# Patient Record
Sex: FEMALE | Race: WHITE | URBAN - METROPOLITAN AREA
[De-identification: names, ages, dates, MRNs, and addresses within clinical notes are randomized per-mention and may not be internally consistent; named-entity substitution may affect disease eponyms.]

---

## 2022-01-31 ENCOUNTER — OFFICE VISIT (OUTPATIENT)
Dept: FAMILY MEDICINE CLINIC | Age: 67
End: 2022-01-31
Payer: MEDICARE

## 2022-01-31 VITALS
TEMPERATURE: 97.9 F | WEIGHT: 140 LBS | HEART RATE: 78 BPM | HEIGHT: 65 IN | OXYGEN SATURATION: 97 % | BODY MASS INDEX: 23.32 KG/M2 | DIASTOLIC BLOOD PRESSURE: 82 MMHG | SYSTOLIC BLOOD PRESSURE: 134 MMHG

## 2022-01-31 DIAGNOSIS — J44.9 CHRONIC OBSTRUCTIVE PULMONARY DISEASE, UNSPECIFIED COPD TYPE (HCC): ICD-10-CM

## 2022-01-31 DIAGNOSIS — Z86.11 HISTORY OF TUBERCULOSIS: ICD-10-CM

## 2022-01-31 DIAGNOSIS — Z13.220 SCREENING FOR HYPERLIPIDEMIA: ICD-10-CM

## 2022-01-31 DIAGNOSIS — Z12.11 COLON CANCER SCREENING: ICD-10-CM

## 2022-01-31 DIAGNOSIS — F17.210 CIGARETTE SMOKER: ICD-10-CM

## 2022-01-31 DIAGNOSIS — Z13.1 ENCOUNTER FOR SCREENING EXAMINATION FOR IMPAIRED GLUCOSE REGULATION AND DIABETES MELLITUS: ICD-10-CM

## 2022-01-31 DIAGNOSIS — Z12.31 BREAST CANCER SCREENING BY MAMMOGRAM: ICD-10-CM

## 2022-01-31 DIAGNOSIS — E78.2 MIXED HYPERLIPIDEMIA: ICD-10-CM

## 2022-01-31 DIAGNOSIS — R51.9 CHRONIC NONINTRACTABLE HEADACHE, UNSPECIFIED HEADACHE TYPE: ICD-10-CM

## 2022-01-31 DIAGNOSIS — M81.0 AGE-RELATED OSTEOPOROSIS WITHOUT CURRENT PATHOLOGICAL FRACTURE: ICD-10-CM

## 2022-01-31 DIAGNOSIS — Z13.820 SCREENING FOR OSTEOPOROSIS: ICD-10-CM

## 2022-01-31 DIAGNOSIS — G89.29 CHRONIC NONINTRACTABLE HEADACHE, UNSPECIFIED HEADACHE TYPE: ICD-10-CM

## 2022-01-31 DIAGNOSIS — Z23 NEED FOR INFLUENZA VACCINATION: ICD-10-CM

## 2022-01-31 DIAGNOSIS — F33.0 MILD EPISODE OF RECURRENT MAJOR DEPRESSIVE DISORDER (HCC): Primary | ICD-10-CM

## 2022-01-31 PROCEDURE — G0008 ADMIN INFLUENZA VIRUS VAC: HCPCS | Performed by: NURSE PRACTITIONER

## 2022-01-31 PROCEDURE — 90694 VACC AIIV4 NO PRSRV 0.5ML IM: CPT | Performed by: NURSE PRACTITIONER

## 2022-01-31 PROCEDURE — 99203 OFFICE O/P NEW LOW 30 MIN: CPT | Performed by: NURSE PRACTITIONER

## 2022-01-31 RX ORDER — CITALOPRAM 20 MG/1
20 TABLET ORAL DAILY
Qty: 30 TABLET | Refills: 2 | Status: SHIPPED | OUTPATIENT
Start: 2022-01-31

## 2022-01-31 RX ORDER — BUDESONIDE AND FORMOTEROL FUMARATE DIHYDRATE 80; 4.5 UG/1; UG/1
2 AEROSOL RESPIRATORY (INHALATION) 2 TIMES DAILY
Qty: 10.2 G | Refills: 3 | Status: SHIPPED | OUTPATIENT
Start: 2022-01-31

## 2022-01-31 SDOH — ECONOMIC STABILITY: FOOD INSECURITY: WITHIN THE PAST 12 MONTHS, THE FOOD YOU BOUGHT JUST DIDN'T LAST AND YOU DIDN'T HAVE MONEY TO GET MORE.: NEVER TRUE

## 2022-01-31 SDOH — ECONOMIC STABILITY: FOOD INSECURITY: WITHIN THE PAST 12 MONTHS, YOU WORRIED THAT YOUR FOOD WOULD RUN OUT BEFORE YOU GOT MONEY TO BUY MORE.: NEVER TRUE

## 2022-01-31 ASSESSMENT — PATIENT HEALTH QUESTIONNAIRE - PHQ9
SUM OF ALL RESPONSES TO PHQ QUESTIONS 1-9: 2
2. FEELING DOWN, DEPRESSED OR HOPELESS: 1
SUM OF ALL RESPONSES TO PHQ QUESTIONS 1-9: 2
SUM OF ALL RESPONSES TO PHQ9 QUESTIONS 1 & 2: 2
SUM OF ALL RESPONSES TO PHQ QUESTIONS 1-9: 2
SUM OF ALL RESPONSES TO PHQ QUESTIONS 1-9: 2
1. LITTLE INTEREST OR PLEASURE IN DOING THINGS: 1

## 2022-01-31 ASSESSMENT — ENCOUNTER SYMPTOMS
PHOTOPHOBIA: 1
VOMITING: 0
CONSTIPATION: 0
NAUSEA: 0
SCALP TENDERNESS: 1
COUGH: 0
SHORTNESS OF BREATH: 1
DIFFICULTY BREATHING: 1
DIARRHEA: 0
BACK PAIN: 0
BLOOD IN STOOL: 0
SORE THROAT: 0
ABDOMINAL PAIN: 0

## 2022-01-31 ASSESSMENT — COLUMBIA-SUICIDE SEVERITY RATING SCALE - C-SSRS
6. HAVE YOU EVER DONE ANYTHING, STARTED TO DO ANYTHING, OR PREPARED TO DO ANYTHING TO END YOUR LIFE?: NO
1. WITHIN THE PAST MONTH, HAVE YOU WISHED YOU WERE DEAD OR WISHED YOU COULD GO TO SLEEP AND NOT WAKE UP?: NO
2. HAVE YOU ACTUALLY HAD ANY THOUGHTS OF KILLING YOURSELF?: NO

## 2022-01-31 ASSESSMENT — COPD QUESTIONNAIRES: COPD: 1

## 2022-01-31 ASSESSMENT — SOCIAL DETERMINANTS OF HEALTH (SDOH): HOW HARD IS IT FOR YOU TO PAY FOR THE VERY BASICS LIKE FOOD, HOUSING, MEDICAL CARE, AND HEATING?: NOT HARD AT ALL

## 2022-01-31 NOTE — PROGRESS NOTES
Vaccine Information Sheet, \"Influenza - Inactivated\"  given to Cassy Desouza, or parent/legal guardian of  Cassy Desouza and verbalized understanding. Patient responses:    Have you ever had a reaction to a flu vaccine? No  Are you able to eat eggs without adverse effects? Yes  Do you have any current illness? No  Have you ever had [place] Syndrome? No    Flu vaccine given per order. Please see immunization tab.

## 2022-01-31 NOTE — PATIENT INSTRUCTIONS
SURVEY:    You may be receiving a survey from [employer] regarding your visit today. Please complete the survey to enable us to provide the highest quality of care to you and your family. If you cannot score us a very good (5 Stars) on any question, please call the office to discuss how we could have made your experience a very good one. Thank you.     Clinical Care Team: RICA Wick-DELMI Orantes LPN    Clerical Team: Shira Gonsalez

## 2022-01-31 NOTE — PROGRESS NOTES
HPI Notes    Name: Latoya Barrera  : 1955         Chief Complaint:     Chief Complaint   Patient presents with   [facility] Establish Care     Patient here today to establish care.  Mental Health Problem     Patient was treated for anxiety and depression, stopped citalopram about 1 year ago . It did work for her.  Hyperlipidemia     Patient said she took atorvastatin at one time, she stopped her medicine about 1 year ago    COPD     She is having trouble breathing, she was told 5 years ago that she had the start of COPD       History of Present Illness:        Mental Health Problem  The primary symptoms include dysphoric mood. The current episode started more than 1 month ago. This is a chronic problem. The onset of the illness is precipitated by emotional stress (her father  when she was 4yo, had emotional stress throughout teens and adult life). The degree of incapacity that she is experiencing as a consequence of her illness is moderate. Additional symptoms of the illness include insomnia (off and on) and headaches. Additional symptoms of the illness do not include feelings of worthlessness, abdominal pain or seizures. She does not admit to suicidal ideas. She does not have a plan to attempt suicide. She does not contemplate harming herself. She has not already injured self. She does not contemplate injuring another person. She has not already  injured another person. Risk factors that are present for mental illness include a history of suicide attempts (attempted suicide by taking pills in her early 25s). Hyperlipidemia  This is a chronic problem. The problem is uncontrolled. Recent lipid tests were reviewed and are high (started with high cholesterol in her 35s). Factors aggravating her hyperlipidemia include smoking and fatty foods. Associated symptoms include shortness of breath. Pertinent negatives include no chest pain.  Current antihyperlipidemic treatment includes statins (not currently taking anything, but was on atorvastatin). Compliance problems include adherence to diet and adherence to exercise. Risk factors for coronary artery disease include dyslipidemia, family history, post-menopausal and a sedentary lifestyle. COPD  She complains of difficulty breathing and shortness of breath. There is no cough. This is a chronic problem. The current episode started more than 1 year ago. The problem occurs daily. Associated symptoms include dyspnea on exertion and headaches. Pertinent negatives include no chest pain, fever or sore throat. Associated symptoms comments: Decreased stamina. Her symptoms are aggravated by strenuous activity. Her symptoms are alleviated by beta-agonist. Risk factors for lung disease include smoking/tobacco exposure. Her past medical history is significant for COPD. Headache   This is a chronic problem. The current episode started more than 1 year ago. The problem occurs intermittently (3 days per week). The pain is located in the frontal region. The pain does not radiate. The pain quality is similar to prior headaches. The quality of the pain is described as aching. Associated symptoms include insomnia (off and on), phonophobia, photophobia and scalp tenderness. Pertinent negatives include no abdominal pain, back pain, coughing, dizziness, fever, nausea, neck pain, seizures, sore throat, vomiting or weakness. She has tried NSAIDs for the symptoms. The treatment provided mild relief. Currently living with niece but she is selling house. Pt has apartment lined up but cannot move in until May. Pt cries over little things some days. Pt NOT current with eye exam.     Pt is Para 1,  0. Menopause in her early 46s. Denies being sexually active. Denies vaginal bleeding or discharge. Pt ambulates with a limp at time due to breaking right femur in her 25s.      Past Medical History:     Past Medical History:   Diagnosis Date    COPD (chronic obstructive pulmonary disease) ([facility] [address]     Hyperlipidemia     Tuberculosis     Hx of tuberculosis back in the 66's      Reviewed all health maintenance requirements and ordered appropriate tests  Health Maintenance Due   Topic Date Due    Hepatitis C screen  Never done    COVID-19 Vaccine (1) Never done    Lipid screen  Never done    Colon cancer screen colonoscopy  Never done    Breast cancer screen  Never done    Shingles Vaccine (1 of 2) Never done    DEXA (modify frequency per FRAX score)  Never done   [employer] Visit (AWV)  Never done       Past Surgical History:     Past Surgical History:   Procedure Laterality Date    ARM SURGERY Right     Fracutre     FACIAL FRACTURE SURGERY      FEMUR FRACTURE SURGERY Right     WRIST FRACTURE SURGERY Right         Medications:       Prior to Admission medications    Medication Sig Start Date End Date Taking? Authorizing Provider   budesonide-formoterol (SYMBICORT) 80-4.5 MCG/ACT AERO Inhale 2 puffs into the lungs 2 times daily 1/31/22  Yes RICA Marks CNP   citalopram (CELEXA) 20 MG tablet Take 1 tablet by mouth daily 1/31/22  Yes RIAC Marks CNP        Allergies: Iodides and Iodine    Social History:     Tobacco:    reports that she has been smoking cigarettes. She started smoking about 50 years ago. She has been smoking about 0.50 packs per day. She has never used smokeless tobacco.  Alcohol:      reports no history of alcohol use. Drug Use:  reports no history of drug use. Family History:        Family History   Problem Relation Age of Onset   Oren Peterson Cancer Father         lung    COPD Sister    Oren Peterson Cancer Brother         liver    COPD Sister        Review of Systems:         Review of Systems   Constitutional: Negative for chills and fever. HENT: Negative for sore throat. Eyes: Positive for photophobia. Respiratory: Positive for shortness of breath. Negative for cough. Cardiovascular: Positive for dyspnea on exertion.  Negative for chest pain, palpitations and leg swelling. Gastrointestinal: Negative for abdominal pain, blood in stool, constipation, diarrhea, nausea and vomiting. Genitourinary: Negative for dysuria, frequency and hematuria. Musculoskeletal: Negative for back pain and neck pain. Skin: Negative for rash. Neurological: Positive for tremors and headaches. Negative for dizziness, seizures and weakness. Hematological: Does not bruise/bleed easily. Psychiatric/Behavioral: Positive for dysphoric mood. Negative for suicidal ideas. The patient has insomnia (off and on). The patient is not nervous/anxious. Physical Exam:     Vitals:  /82   Pulse 78   Temp 97.9 °F (36.6 °C) (Oral)   Ht 5' 5\" (1.651 m)   Wt 140 lb (63.5 kg)   SpO2 97%    L/min   BMI 23.30 kg/m²       Physical Exam  Vitals and nursing note reviewed. Constitutional:       Appearance: Normal appearance. She is well-developed. HENT:      Head: Normocephalic. Right Ear: Hearing, tympanic membrane and external ear normal.      Left Ear: Hearing, tympanic membrane and external ear normal.      Nose: Nose normal.      Mouth/Throat:      Pharynx: Uvula midline. Eyes:      Conjunctiva/sclera: Conjunctivae normal.      Pupils: Pupils are equal, round, and reactive to light. Neck:      Thyroid: No thyroid mass. Vascular: No carotid bruit. Trachea: Trachea normal.   Cardiovascular:      Rate and Rhythm: Normal rate and regular rhythm. Pulses:           Dorsalis pedis pulses are 2+ on the right side and 2+ on the left side. Heart sounds: Normal heart sounds, S1 normal and S2 normal.   Pulmonary:      Effort: Pulmonary effort is normal.      Breath sounds: Examination of the right-lower field reveals decreased breath sounds. Examination of the left-lower field reveals decreased breath sounds. Decreased breath sounds present. Abdominal:      Palpations: Abdomen is soft. Tenderness:  There is no abdominal tenderness. Musculoskeletal:         General: Normal range of motion. Cervical back: Normal range of motion. Skin:     General: Skin is warm and dry. Findings: No rash. Neurological:      Mental Status: She is alert and oriented to person, place, and time. GCS: GCS eye subscore is 4. GCS verbal subscore is 5. GCS motor subscore is 6. Deep Tendon Reflexes: Reflexes are normal and symmetric. Psychiatric:         Speech: Speech normal.         Behavior: Behavior normal.         Thought Content: Thought content normal.         Judgment: Judgment normal.               Data:     No results found for: NA, K, CL, CO2, BUN, CREATININE, GLUCOSE, PROT, LABALBU, BILITOT, ALKPHOS, AST, ALT  No results found for: WBC, RBC, HGB, HCT, MCV, MCH, MCHC, RDW, PLT, MPV  No results found for: TSH  No results found for: CHOL, LDL, HDL, PSA, LABA1C       Assessment & Plan        Diagnosis Orders   1. [facility] episode of [facility]  --pt has done well on celexa in the past. Will restart celexa 20mg daily     2. Mixed hyperlipidemia   --will get lipid panel and evaluate need Lipid Panel   3. Chronic obstructive pulmonary disease, unspecified COPD type (HCC)   --Peak flow = 200. Will start pt on symbicort 80mcg 2 puff BID. Pt educated about medication. Comprehensive Metabolic Panel   4. Chronic nonintractable headache, unspecified headache type  --continue with ibuprofen prn use  CBC Auto Differential   5. Breast cancer screening by mammogram  SANDRA KASSI DIGITAL SCREEN BILATERAL   6. Colon cancer screening  Donna Bolton MD, General Surgery, [facility]   7. Screening for osteoporosis  DEXA BONE DENSITY 2 SITES   8. History of tuberculosis     9. Screening for hyperlipidemia  Lipid Panel   10. Encounter for screening examination for impaired glucose regulation and diabetes mellitus  Comprehensive Metabolic Panel   11.  Cigarette smoker   --pt strongly encouraged to stop smoking CBC Auto Differential   12. Age-related osteoporosis without current pathological fracture   --DEXA scan  DEXA BONE DENSITY 2 SITES   13. Need for influenza vaccination  INFLUENZA, QUADV, ADJUVANTED, 65 YRS =, IM, PF, PREFILL SYR, 0.5ML (FLUAD)     Patient verbalizes understanding and agreement with plan. All questions answered. If symptoms do not resolve or worsen, return to office. Completed Refills   Requested Prescriptions     Signed Prescriptions Disp Refills    budesonide-formoterol (SYMBICORT) 80-4.5 MCG/ACT AERO 10.2 g 3     Sig: Inhale 2 puffs into the lungs 2 times daily    citalopram (CELEXA) 20 MG tablet 30 tablet 2     Sig: Take 1 tablet by mouth daily     No follow-ups on file.      Orders Placed This Encounter   Medications    budesonide-formoterol (SYMBICORT) 80-4.5 MCG/ACT AERO     Sig: Inhale 2 puffs into the lungs 2 times daily     Dispense:  10.2 g     Refill:  3    citalopram (CELEXA) 20 MG tablet     Sig: Take 1 tablet by mouth daily     Dispense:  30 tablet     Refill:  2     Orders Placed This Encounter   Procedures    SANDRA KASSI DIGITAL SCREEN BILATERAL     Standing Status:   Future     Standing Expiration Date:   3/31/2023     Order Specific Question:   Reason for exam:     Answer:   screening    DEXA BONE DENSITY 2 SITES     Standing Status:   Future     Standing Expiration Date:   1/31/2023     Order Specific Question:   Reason for exam:     Answer:   screening DEXA    INFLUENZA, QUADV, ADJUVANTED, 72 YRS =, IM, PF, PREFILL SYR, 0.5ML (FLUAD)    CBC Auto Differential     Standing Status:   Future     Standing Expiration Date:   3/7/2023    Comprehensive Metabolic Panel     Standing Status:   Future     Standing Expiration Date:   3/7/2023    Lipid Panel     Standing Status:   Future     Standing Expiration Date:   3/7/2023     Order Specific Question:   Is Patient Fasting?/# of Hours     Answer:   Nathanael Silveira MD, General Surgery, [facility]     Referral Priority:   Routine     Referral Type:   Eval and Treat     Referral Reason:   Specialty Services Required     Referred to Provider:   Yehuda Cervantes MD     Requested Specialty:   General Surgery     Number of Visits Requested:   1         Patient Instructions   SURVEY:    You may be receiving a survey from [employer] regarding your visit today. Please complete the survey to enable us to provide the highest quality of care to you and your family. If you cannot score us a very good (5 Stars) on any question, please call the office to discuss how we could have made your experience a very good one. Thank you. Clinical Care Team: MAY Sotomayor LPN    Clerical Team: Sergei Galindo        Electronically signed by RICA Sotomayor CNP on 1/31/2022 at 4:54 PM           Completed Refills      Requested Prescriptions     Signed Prescriptions Disp Refills    budesonide-formoterol (SYMBICORT) 80-4.5 MCG/ACT AERO 10.2 g 3     Sig: Inhale 2 puffs into the lungs 2 times daily    citalopram (CELEXA) 20 MG tablet 30 tablet 2     Sig: Take 1 tablet by mouth daily         Shadia Veronica received counseling on the following healthy behaviors: nutrition, exercise, medication adherence and tobacco cessation  Reviewed prior labs and health maintenance. Continue current medications, diet and exercise. Discussed use, benefit, and side effects of prescribed medications. Barriers to medication compliance addressed. Patient given educational materials - see patient instructions. All patient questions answered. Patient voiced understanding.

## 2022-03-03 ENCOUNTER — TELEPHONE (OUTPATIENT)
Dept: SURGERY | Age: 67
End: 2022-03-03

## 2022-05-20 ENCOUNTER — TELEPHONE (OUTPATIENT)
Dept: FAMILY MEDICINE CLINIC | Age: 67
End: 2022-05-20

## 2024-03-15 ENCOUNTER — HOSPITAL ENCOUNTER (EMERGENCY)
Age: 69
Discharge: HOME | End: 2024-03-15
Payer: COMMERCIAL

## 2024-03-15 VITALS
DIASTOLIC BLOOD PRESSURE: 92 MMHG | RESPIRATION RATE: 24 BRPM | TEMPERATURE: 98.9 F | HEIGHT: 62 IN | SYSTOLIC BLOOD PRESSURE: 138 MMHG | HEART RATE: 88 BPM | WEIGHT: 140 LBS | BODY MASS INDEX: 25.76 KG/M2 | OXYGEN SATURATION: 97 %

## 2024-03-15 DIAGNOSIS — K91.840 SURGICAL WOUND HEMORRHAGE AFTER DENTAL PROCEDURE: Primary | ICD-10-CM

## 2024-03-15 DIAGNOSIS — Z98.818 SURGICAL WOUND HEMORRHAGE AFTER DENTAL PROCEDURE: Primary | ICD-10-CM

## 2024-03-15 PROCEDURE — 99282 EMERGENCY DEPT VISIT SF MDM: CPT

## 2024-03-15 ASSESSMENT — PAIN - FUNCTIONAL ASSESSMENT: PAIN_FUNCTIONAL_ASSESSMENT: NONE - DENIES PAIN

## 2024-03-15 NOTE — ED NOTES
Patient had rinsed her mouth out with cold ice water.  Patient reports that bleeding has decreased from earlier.

## 2024-03-15 NOTE — ED NOTES
Patient reports that she had multiple teeth removed today.  Has had non stop bleeding from mouth.

## 2025-07-30 ENCOUNTER — TRANSCRIBE ORDERS (OUTPATIENT)
Dept: ADMINISTRATIVE | Age: 70
End: 2025-07-30

## 2025-07-30 DIAGNOSIS — Z12.31 ENCOUNTER FOR SCREENING MAMMOGRAM FOR MALIGNANT NEOPLASM OF BREAST: Primary | ICD-10-CM

## 2025-08-13 ENCOUNTER — HOSPITAL ENCOUNTER (OUTPATIENT)
Dept: LAB | Age: 70
Discharge: HOME | End: 2025-08-13
Payer: COMMERCIAL

## 2025-08-13 DIAGNOSIS — Z13.1 DIABETES MELLITUS SCREENING: ICD-10-CM

## 2025-08-13 DIAGNOSIS — Z11.4 ENCOUNTER FOR SCREENING FOR HIV: ICD-10-CM

## 2025-08-13 DIAGNOSIS — E78.2 MIXED HYPERLIPIDEMIA: ICD-10-CM

## 2025-08-13 DIAGNOSIS — Z11.59 ENCOUNTER FOR HCV SCREENING TEST FOR LOW RISK PATIENT: ICD-10-CM

## 2025-08-13 LAB
ALBUMIN SERPL-MCNC: 3.9 G/DL (ref 3.5–5.2)
ALBUMIN/GLOB SERPL: 1.2 {RATIO} (ref 1–2.5)
ALP SERPL-CCNC: 79 U/L (ref 35–104)
ALT SERPL-CCNC: 11 U/L (ref 10–35)
ANION GAP SERPL CALCULATED.3IONS-SCNC: 11 MMOL/L (ref 9–16)
AST SERPL-CCNC: 17 U/L (ref 10–35)
BILIRUB SERPL-MCNC: 0.4 MG/DL (ref 0–1.2)
BUN SERPL-MCNC: 13 MG/DL (ref 8–23)
BUN/CREAT SERPL: 22 (ref 9–20)
CALCIUM SERPL-MCNC: 8.9 MG/DL (ref 8.6–10.4)
CHLORIDE SERPL-SCNC: 104 MMOL/L (ref 98–107)
CHOLEST SERPL-MCNC: 227 MG/DL (ref 0–199)
CHOLESTEROL/HDL RATIO: 5
CO2 SERPL-SCNC: 25 MMOL/L (ref 20–31)
CREAT SERPL-MCNC: 0.6 MG/DL (ref 0.5–0.9)
ERYTHROCYTE [DISTWIDTH] IN BLOOD BY AUTOMATED COUNT: 12.3 % (ref 11.8–14.4)
EST. AVERAGE GLUCOSE BLD GHB EST-MCNC: 108 MG/DL
GFR, ESTIMATED: >90 ML/MIN/1.73M2
GLUCOSE SERPL-MCNC: 99 MG/DL (ref 74–99)
HBA1C MFR BLD: 5.4 % (ref 4–6)
HCT VFR BLD AUTO: 38.9 % (ref 36.3–47.1)
HCV AB SERPL QL IA: NONREACTIVE
HDLC SERPL-MCNC: 45 MG/DL
HGB BLD-MCNC: 13 G/DL (ref 11.9–15.1)
HIV 1+2 AB+HIV1 P24 AG SERPL QL IA: NONREACTIVE
LDLC SERPL CALC-MCNC: 145 MG/DL (ref 0–100)
MCH RBC QN AUTO: 32.9 PG (ref 25.2–33.5)
MCHC RBC AUTO-ENTMCNC: 33.4 G/DL (ref 28.4–34.8)
MCV RBC AUTO: 98.5 FL (ref 82.6–102.9)
NRBC BLD-RTO: 0 PER 100 WBC
PLATELET # BLD AUTO: 297 K/UL (ref 138–453)
PMV BLD AUTO: 8.9 FL (ref 8.1–13.5)
POTASSIUM SERPL-SCNC: 4 MMOL/L (ref 3.7–5.3)
PROT SERPL-MCNC: 7.2 G/DL (ref 6.6–8.7)
RBC # BLD AUTO: 3.95 M/UL (ref 3.95–5.11)
SODIUM SERPL-SCNC: 140 MMOL/L (ref 136–145)
TRIGL SERPL-MCNC: 187 MG/DL
VLDLC SERPL CALC-MCNC: 37 MG/DL (ref 1–30)
WBC OTHER # BLD: 8.4 K/UL (ref 3.5–11.3)

## 2025-08-13 PROCEDURE — 86803 HEPATITIS C AB TEST: CPT

## 2025-08-13 PROCEDURE — 87389 HIV-1 AG W/HIV-1&-2 AB AG IA: CPT

## 2025-08-13 PROCEDURE — 36415 COLL VENOUS BLD VENIPUNCTURE: CPT

## 2025-08-13 PROCEDURE — 80053 COMPREHEN METABOLIC PANEL: CPT

## 2025-08-13 PROCEDURE — 83036 HEMOGLOBIN GLYCOSYLATED A1C: CPT

## 2025-08-13 PROCEDURE — 80061 LIPID PANEL: CPT

## 2025-08-13 PROCEDURE — 85027 COMPLETE CBC AUTOMATED: CPT

## 2025-08-29 ENCOUNTER — HOSPITAL ENCOUNTER (OUTPATIENT)
Dept: CT IMAGING | Age: 70
Discharge: HOME | End: 2025-08-31
Payer: COMMERCIAL

## 2025-08-29 DIAGNOSIS — Z87.891 PERSONAL HISTORY OF TOBACCO USE: ICD-10-CM

## 2025-08-29 PROCEDURE — 71271 CT THORAX LUNG CANCER SCR C-: CPT
